# Patient Record
Sex: MALE | Race: WHITE | NOT HISPANIC OR LATINO | RURAL
[De-identification: names, ages, dates, MRNs, and addresses within clinical notes are randomized per-mention and may not be internally consistent; named-entity substitution may affect disease eponyms.]

---

## 2018-10-14 ENCOUNTER — OFFICE VISIT (OUTPATIENT)
Dept: RETAIL CLINIC | Facility: CLINIC | Age: 58
End: 2018-10-14

## 2018-10-14 VITALS
SYSTOLIC BLOOD PRESSURE: 165 MMHG | RESPIRATION RATE: 20 BRPM | DIASTOLIC BLOOD PRESSURE: 90 MMHG | TEMPERATURE: 98.4 F | OXYGEN SATURATION: 99 % | WEIGHT: 215 LBS | HEART RATE: 80 BPM

## 2018-10-14 DIAGNOSIS — L03.114 CELLULITIS OF LEFT ARM: Primary | ICD-10-CM

## 2018-10-14 PROCEDURE — 99203 OFFICE O/P NEW LOW 30 MIN: CPT | Performed by: NURSE PRACTITIONER

## 2018-10-14 RX ORDER — DOXYCYCLINE 100 MG/1
100 CAPSULE ORAL 2 TIMES DAILY
Qty: 20 CAPSULE | Refills: 0 | Status: SHIPPED | OUTPATIENT
Start: 2018-10-14 | End: 2018-10-24

## 2018-10-14 NOTE — PROGRESS NOTES
"Subjective   Ron Flowers is a 58 y.o. male.   Chief Complaint   Patient presents with   • Rash      Rash   This is a new problem. The current episode started in the past 7 days. The problem has been gradually worsening since onset. The affected locations include the left arm. The rash is characterized by redness, swelling and pain (began as itchy \"bite\" possibly from mosquito; has become increasingly red, swollen, warm and tender to touch). He was exposed to an insect bite/sting. (Warmth, edema, redness, tenderness) Past treatments include nothing. The treatment provided no relief.        The following portions of the patient's history were reviewed and updated as appropriate: allergies, current medications, past family history, past medical history, past social history, past surgical history and problem list.    Review of Systems   Constitutional: Negative.    HENT: Negative.    Eyes: Negative.    Respiratory: Negative.    Gastrointestinal: Negative.    Genitourinary: Negative.    Skin: Positive for rash.   Allergic/Immunologic: Negative.    Psychiatric/Behavioral: Negative.    All other systems reviewed and are negative.      Objective   No Known Allergies    Physical Exam   Constitutional: He is oriented to person, place, and time. He appears well-developed and well-nourished.   HENT:   Mouth/Throat: Oropharynx is clear and moist.   Eyes: Pupils are equal, round, and reactive to light.   Neck: Neck supple.   Cardiovascular: Normal rate.    Pulmonary/Chest: Effort normal.   Lymphadenopathy:     He has no axillary adenopathy.        Left: No epitrochlear adenopathy present.   Neurological: He is alert and oriented to person, place, and time.   Skin: Lesion and rash noted. Rash is maculopapular. There is erythema.        10 cm X 5 cm area of redness, warmth, tenderness, with edema left forearm proximal to AC. Center of area has open/crusted area   Psychiatric: He has a normal mood and affect.   Vitals " reviewed.      Assessment/Plan   Ron was seen today for rash.    Diagnoses and all orders for this visit:    Cellulitis of left arm    Other orders  -     doxycycline (MONODOX) 100 MG capsule; Take 1 capsule by mouth 2 (Two) Times a Day for 10 days.  -     mupirocin (BACTROBAN) 2 % ointment; Apply  topically to the appropriate area as directed 3 (Three) Times a Day.                 This document has been electronically signed by JORDAN Grullon October 14, 2018 12:37 PM

## 2021-02-25 DIAGNOSIS — Z23 IMMUNIZATION DUE: ICD-10-CM
